# Patient Record
Sex: FEMALE | Race: WHITE | HISPANIC OR LATINO
[De-identification: names, ages, dates, MRNs, and addresses within clinical notes are randomized per-mention and may not be internally consistent; named-entity substitution may affect disease eponyms.]

---

## 2022-05-25 ENCOUNTER — APPOINTMENT (OUTPATIENT)
Dept: ORTHOPEDIC SURGERY | Facility: CLINIC | Age: 57
End: 2022-05-25
Payer: COMMERCIAL

## 2022-05-25 VITALS — WEIGHT: 142 LBS | BODY MASS INDEX: 27.88 KG/M2 | HEIGHT: 60 IN

## 2022-05-25 DIAGNOSIS — F17.200 NICOTINE DEPENDENCE, UNSPECIFIED, UNCOMPLICATED: ICD-10-CM

## 2022-05-25 DIAGNOSIS — Z78.9 OTHER SPECIFIED HEALTH STATUS: ICD-10-CM

## 2022-05-25 PROBLEM — Z00.00 ENCOUNTER FOR PREVENTIVE HEALTH EXAMINATION: Status: ACTIVE | Noted: 2022-05-25

## 2022-05-25 PROCEDURE — 73564 X-RAY EXAM KNEE 4 OR MORE: CPT | Mod: LT

## 2022-05-25 PROCEDURE — 99204 OFFICE O/P NEW MOD 45 MIN: CPT

## 2022-05-25 RX ORDER — NAPROXEN 500 MG/1
500 TABLET ORAL TWICE DAILY
Qty: 30 | Refills: 2 | Status: ACTIVE | COMMUNITY
Start: 2022-05-25 | End: 1900-01-01

## 2022-05-25 NOTE — PHYSICAL EXAM
[de-identified] : Neurologic: normal coordination, normal DTR UE/LE , normal sensation, normal mood and affect, orientated and able to communicate. \par Skin: normal skin, no rash, no ulcers and no lesions. \par Lymphatic: no obvious lymphadenopathy in areas examined. \par Constitutional: well developed and well nourished. \par Cardiovascular: peripheral vascular exam is grossly normal. \par Pulmonary: no respiratory distress, lungs clear to auscultation bilaterally. \par Abdomen: normal bowel sounds, non-tender, no HSM and no mass. \par \par Left Knee: Large effusion\par Medial joint line tenderness\par Medial facet of patella tenderness\par Positive Crissy's test \par Posterior tenderness\par \par Xray of the LEFT KNEE - 4 views - is as follows: Patella jami

## 2022-05-25 NOTE — HISTORY OF PRESENT ILLNESS
[de-identified] : The patient is a 56 year old right hand dominant female who presents today complaining of left knee pain   \par Date of Injury/Onset: 5/18/22\par Pain:    At Rest: 0/10 \par With Activity:  4/10 \par Mechanism of injury: pt states she was going up the stairs and felt her knee shift\par This is not a Work Related Injury being treated under Worker's Compensation.\par This is not an athletic injury occurring associated with an interscholastic or organized sports team.\par Quality of symptoms: Dull/ Ache\par Improves with: Advil\par Worse with: Pressure/ Stairs \par Prior treatment: None\par Prior Imaging: None\par Out of work/sport: Working_\par School/Sport/Position/Occupation: Loan Admin \par Additional Information: None\par

## 2022-05-25 NOTE — DISCUSSION/SUMMARY
[Surgical risks reviewed] : Surgical risks reviewed [de-identified] : Follow up after MRI of Left Knee to eval medial meniscus tear\par (questionable root tear)\par Playmaker brace provided\par -----------------------------------------------\par Home Exercise\par The patient is instructed on a home exercise program.\par \par ENDER TUTTLE Acting as a Scribe for Dr. Mac\par I, Ender Tuttle, attest that this documentation has been prepared under the direction and in the presence of Provider Yahir Mac MD.\par \par Activity Modification\par The patient was advised to modify their activities.\par \par Dx / Natural History\par The patient was advised of the diagnosis.  The natural history of the pathology was explained in full to the patient in layman's terms.  Several different treatment options were discussed and explained in full to the patient including the risks and benefits of both surgical and non-surgical treatments.  All questions and concerns were answered.\par \par Pain Guide Activities\par The patient was advised to let pain guide the gradual advancement of activities.\par \par RICE\par I explained to the patient that rest, ice, compression, and elevation would benefit them.  They may return to activity after follow-up or when they no longer have any pain.

## 2022-05-30 ENCOUNTER — FORM ENCOUNTER (OUTPATIENT)
Age: 57
End: 2022-05-30

## 2022-05-31 ENCOUNTER — APPOINTMENT (OUTPATIENT)
Dept: MRI IMAGING | Facility: CLINIC | Age: 57
End: 2022-05-31
Payer: COMMERCIAL

## 2022-05-31 PROCEDURE — 73721 MRI JNT OF LWR EXTRE W/O DYE: CPT | Mod: LT

## 2022-06-01 ENCOUNTER — APPOINTMENT (OUTPATIENT)
Dept: ORTHOPEDIC SURGERY | Facility: CLINIC | Age: 57
End: 2022-06-01
Payer: COMMERCIAL

## 2022-06-01 PROCEDURE — 99214 OFFICE O/P EST MOD 30 MIN: CPT

## 2022-06-01 NOTE — PHYSICAL EXAM
[de-identified] : Neurologic: normal coordination, normal DTR UE/LE , normal sensation, normal mood and affect, orientated and able to communicate. \par Skin: normal skin, no rash, no ulcers and no lesions. \par Lymphatic: no obvious lymphadenopathy in areas examined. \par Constitutional: well developed and well nourished. \par Cardiovascular: peripheral vascular exam is grossly normal. \par Pulmonary: no respiratory distress, lungs clear to auscultation bilaterally. \par Abdomen: normal bowel sounds, non-tender, no HSM and no mass. \par \par Left Knee: Large effusion\par Medial joint line tenderness\par Medial facet of patella tenderness\par Positive Crissy's test \par Posterior tenderness\par \par Xray of the LEFT KNEE - 4 views - is as follows: Patella jami

## 2022-06-01 NOTE — DISCUSSION/SUMMARY
[Surgical risks reviewed] : Surgical risks reviewed [de-identified] : Conservative treatment, nontreatment, nonsurgical intervention and surgical intervention treatment options have been reviewed with the patient.  The patient continues to be symptomatic [and has failed conservative treatment], and elects to move forward with surgical intervention.  The patient is indicated for LEFT KNEE PARTIAL MEDIAL MENISCECTOMY POSSIBLE MEDIAL MENISCUS ROOT REPAIR and all indicated procedures. As such the alternatives, benefits and risks, of the above procedure, including but not limited to bleeding, infection, neurovascular injury, loss of limb, loss of life,  DVT, PE, RSD, inability to return to previous level of activity, inability to return to previous level of employment, advancement of or to osteoarthritic changes, joint instability or motion loss, hardware failure or migration, failure to resolve all symptoms, failure to return to sports and need for further procedures, as well as specific risk of re-tear and OA were discussed with the patient and/or their legal guardian who agreed to move forward with surgical intervention.  They have reviewed and signed the consent form today after expressing understanding of the above documented conversation. The patient or their representative will contact my office as instructed on the preoperative instruction sheet they received today to schedule surgery in a timely manner as discussed.\par \par As a post-operative protocol, I am prescribing an iceless cold/heat compression therapy device for at home use to be used 3-5 times per day at 40 degrees for 35 days as an alternative to pain medication. I would like my patient to begin with simultaneous cold & compression therapy at 10mm pressure. At the patients follow up I will determine whether they should continue with cold, or if they should transition to contrast cold/heat compression therapy. Unlike a conventional cold therapy unit that requires ice, the ThermX iceless device is set to a prescribed temperature that it will remain throughout the entire duration of use, whether that be cold compression, heat compression, or contrast compression. Cold therapy units that depend on ice melt over a very short period and do not provide compression which limits the compliance and effectiveness for pain/inflammation reduction that I am targeting for my patient. I have reached out to [facility] to supply this device as they are the exclusive provider of the ThermX and the patient will be contacted and instructed on how to utilize the device. \par -----------------------------------------------\par Home Exercise\par The patient is instructed on a home exercise program.\par \par ENDER TUTTLE Acting as a Scribe for Dr. Mac\par I, Ender Tuttle, attest that this documentation has been prepared under the direction and in the presence of Provider Yahir Mac MD.\par \par Activity Modification\par The patient was advised to modify their activities.\par \par Dx / Natural History\par The patient was advised of the diagnosis.  The natural history of the pathology was explained in full to the patient in layman's terms.  Several different treatment options were discussed and explained in full to the patient including the risks and benefits of both surgical and non-surgical treatments.  All questions and concerns were answered.\par \par Pain Guide Activities\par The patient was advised to let pain guide the gradual advancement of activities.\par \par RICE\par I explained to the patient that rest, ice, compression, and elevation would benefit them.  They may return to activity after follow-up or when they no longer have any pain.

## 2022-06-01 NOTE — HISTORY OF PRESENT ILLNESS
[de-identified] : The patient is a 56 year old right hand dominant female who presents today complaining of left knee pain \par Date of Injury/Onset: 5/18/22\par Pain: At Rest: 0/10 \par With Activity: 4/10 \par Mechanism of injury: pt states she was going up the stairs and felt her knee shift\par This is not a Work Related Injury being treated under Worker's Compensation.\par This is not an athletic injury occurring associated with an interscholastic or organized sports team.\par Quality of symptoms: Dull/ Ache\par Improves with: Advil\par Worse with: Pressure/ Stairs \par Prior treatment: None\par Prior Imaging: None\par Out of work/sport: Working_\par School/Sport/Position/Occupation: Loan Admin \par Additional Information: None

## 2022-06-17 ENCOUNTER — RESULT REVIEW (OUTPATIENT)
Age: 57
End: 2022-06-17

## 2022-06-17 ENCOUNTER — APPOINTMENT (OUTPATIENT)
Dept: ORTHOPEDIC SURGERY | Facility: HOSPITAL | Age: 57
End: 2022-06-17
Payer: COMMERCIAL

## 2022-06-17 PROCEDURE — 29875 ARTHRS KNEE SURG SYNVCT LMTD: CPT | Mod: 59,LT

## 2022-06-17 PROCEDURE — 29881 ARTHRS KNE SRG MNISECTMY M/L: CPT | Mod: AS,LT

## 2022-06-17 PROCEDURE — 29874 ARTHRS KNEE SURG RMV LOOS/FB: CPT | Mod: AS,59,LT

## 2022-06-17 PROCEDURE — 29875 ARTHRS KNEE SURG SYNVCT LMTD: CPT | Mod: AS,59,LT

## 2022-06-17 PROCEDURE — 29874 ARTHRS KNEE SURG RMV LOOS/FB: CPT | Mod: 59,LT

## 2022-06-17 PROCEDURE — 29881 ARTHRS KNE SRG MNISECTMY M/L: CPT | Mod: LT

## 2022-06-17 RX ORDER — ONDANSETRON 4 MG/1
4 TABLET, ORALLY DISINTEGRATING ORAL
Qty: 15 | Refills: 0 | Status: ACTIVE | COMMUNITY
Start: 2022-06-17 | End: 1900-01-01

## 2022-06-17 RX ORDER — HYDROCODONE BITARTRATE AND ACETAMINOPHEN 5; 325 MG/1; MG/1
5-325 TABLET ORAL
Qty: 30 | Refills: 0 | Status: ACTIVE | COMMUNITY
Start: 2022-06-17 | End: 1900-01-01

## 2022-06-26 ENCOUNTER — FORM ENCOUNTER (OUTPATIENT)
Age: 57
End: 2022-06-26

## 2022-06-28 ENCOUNTER — APPOINTMENT (OUTPATIENT)
Dept: ORTHOPEDIC SURGERY | Facility: CLINIC | Age: 57
End: 2022-06-28
Payer: COMMERCIAL

## 2022-06-28 VITALS — HEIGHT: 60 IN | WEIGHT: 142 LBS | BODY MASS INDEX: 27.88 KG/M2

## 2022-06-28 PROCEDURE — 99024 POSTOP FOLLOW-UP VISIT: CPT

## 2022-06-28 NOTE — DISCUSSION/SUMMARY
[de-identified] : Inspected wound\par Removed sutures\par Applied steri-strips\par Reviewed all surgical images with patient and provided copies to take home\par Continue physical therapy\par Provided printout for Reparel sleeve\par Follow up in 6 weeks\par \par -----------------------------------------------\par Home Exercise\par The patient is instructed on a home exercise program.\par \par ENDER TUTTLE Acting as a Scribe for Dr. Mac\par I, Ender Tuttle, attest that this documentation has been prepared under the direction and in the presence of Provider Yahir Mac MD.\par \par Activity Modification\par The patient was advised to modify their activities.\par \par Dx / Natural History\par The patient was advised of the diagnosis.  The natural history of the pathology was explained in full to the patient in layman's terms.  Several different treatment options were discussed and explained in full to the patient including the risks and benefits of both surgical and non-surgical treatments.  All questions and concerns were answered.\par \par Pain Guide Activities\par The patient was advised to let pain guide the gradual advancement of activities.\par \par RICE\par I explained to the patient that rest, ice, compression, and elevation would benefit them.  They may return to activity after follow-up or when they no longer have any pain.

## 2022-06-28 NOTE — ASSESSMENT
[FreeTextEntry1] : MRI [facility] 5/31/22 Left Knee\par My own reading - no radiologist report yet. \par \par Complex medial meniscus tear posterior horn and body with displaced fragment. Likely complete root tear as well.\par

## 2022-06-28 NOTE — HISTORY OF PRESENT ILLNESS
[de-identified] : The patient is s/p Left knee diagnostic arthroscopy, partial medial meniscectomy removal of loose body, limited synovectomy \par Date of Surgery: 6/17/22\par Pain:    At Rest: 2/10 \par With Activity:  5/10 \par Mechanism of injury: Pt states she was going up the stairs and felt her knee shift\par This is not a Work Related Injury being treated under Worker's Compensation.\par This is not an athletic injury occurring associated with an interscholastic or organized sports team.\par Treatment/Imaging/Studies Since Last Visit: sx \par 	Reports Available For Review Today: sx doc\par Out of work/sport: _, since _\par School/Sport/Position/Occupation: loan admin \par Change since last visit: Surgery,  \par Additional Information: None\par  \par

## 2022-06-28 NOTE — PHYSICAL EXAM
[de-identified] : Neurologic: normal coordination, normal DTR UE/LE , normal sensation, normal mood and affect, orientated and able to communicate. \par Skin: normal skin, no rash, no ulcers and no lesions. \par Lymphatic: no obvious lymphadenopathy in areas examined. \par Constitutional: well developed and well nourished. \par Cardiovascular: peripheral vascular exam is grossly normal. \par Pulmonary: no respiratory distress, lungs clear to auscultation bilaterally. \par Abdomen: normal bowel sounds, non-tender, no HSM and no mass. \par \par Left Knee: No effusion, clean and dry incisions, intact skin, no fluctuance, no sign of infection, no wound erythema, no induration, no drainage, sutures removed, steri-strips applied. \par \par Xray of the LEFT KNEE - 4 views - is as follows: Patella jami

## 2022-08-09 ENCOUNTER — APPOINTMENT (OUTPATIENT)
Dept: ORTHOPEDIC SURGERY | Facility: CLINIC | Age: 57
End: 2022-08-09

## 2022-08-09 VITALS — BODY MASS INDEX: 27.88 KG/M2 | WEIGHT: 142 LBS | HEIGHT: 60 IN

## 2022-08-09 PROCEDURE — 99024 POSTOP FOLLOW-UP VISIT: CPT

## 2022-08-09 NOTE — HISTORY OF PRESENT ILLNESS
[de-identified] : The patient is s/p Left knee diagnostic arthroscopy, partial medial meniscectomy removal of loose body, limited synovectomy \par Date of Surgery: 6/17/22\par Pain: At Rest: 0/10 \par With Activity: 0/10 \par Mechanism of injury: Pt states she was going up the stairs and felt her knee shift\par This is not a Work Related Injury being treated under Worker's Compensation.\par This is not an athletic injury occurring associated with an interscholastic or organized sports team.\par Treatment/Imaging/Studies Since Last Visit: PT\par 	Reports Available For Review Today: N/A\par Out of work/sport: _, since _\par School/Sport/Position/Occupation: loan admin \par Change since last visit: Minimal persistent pressure and stiffness \par Additional Information: None

## 2022-08-09 NOTE — PHYSICAL EXAM
[de-identified] : Neurologic: normal coordination, normal DTR UE/LE , normal sensation, normal mood and affect, orientated and able to communicate. \par Skin: normal skin, no rash, no ulcers and no lesions. \par Lymphatic: no obvious lymphadenopathy in areas examined. \par Constitutional: well developed and well nourished. \par Cardiovascular: peripheral vascular exam is grossly normal. \par Pulmonary: no respiratory distress, lungs clear to auscultation bilaterally. \par Abdomen: normal bowel sounds, non-tender, no HSM and no mass. \par \par Left Knee: Moderate effusion, clean and dry incisions, intact skin, no fluctuance, no sign of infection, no wound erythema, no induration, no drainage,\par \par Xray of the LEFT KNEE - 4 views - is as follows: Patella jami

## 2022-08-09 NOTE — DISCUSSION/SUMMARY
[de-identified] : Aspirated 5ml of clear synovial fluid from L knee using ultrasound for proper placement\par No corticosteroid injection\par Follow up 6 weeks \par \par -----------------------------------------------\par Home Exercise\par The patient is instructed on a home exercise program.\par \par ENDER TUTTLE Acting as a Scribe for Dr. Mac\par I, Ender Tuttle, attest that this documentation has been prepared under the direction and in the presence of Provider Yahir Mac MD.\par \par Activity Modification\par The patient was advised to modify their activities.\par \par Dx / Natural History\par The patient was advised of the diagnosis.  The natural history of the pathology was explained in full to the patient in layman's terms.  Several different treatment options were discussed and explained in full to the patient including the risks and benefits of both surgical and non-surgical treatments.  All questions and concerns were answered.\par \par Pain Guide Activities\par The patient was advised to let pain guide the gradual advancement of activities.\par \par RICE\par I explained to the patient that rest, ice, compression, and elevation would benefit them.  They may return to activity after follow-up or when they no longer have any pain.

## 2022-09-20 ENCOUNTER — APPOINTMENT (OUTPATIENT)
Dept: ORTHOPEDIC SURGERY | Facility: CLINIC | Age: 57
End: 2022-09-20
Payer: COMMERCIAL

## 2022-09-20 DIAGNOSIS — S89.92XA UNSPECIFIED INJURY OF LEFT LOWER LEG, INITIAL ENCOUNTER: ICD-10-CM

## 2022-09-20 DIAGNOSIS — M25.562 PAIN IN LEFT KNEE: ICD-10-CM

## 2022-09-20 DIAGNOSIS — M23.92 UNSPECIFIED INTERNAL DERANGEMENT OF LEFT KNEE: ICD-10-CM

## 2022-09-20 DIAGNOSIS — S83.232A COMPLEX TEAR OF MEDIAL MENISCUS, CURRENT INJURY, LEFT KNEE, INITIAL ENCOUNTER: ICD-10-CM

## 2022-09-20 PROCEDURE — 99214 OFFICE O/P EST MOD 30 MIN: CPT

## 2022-09-20 NOTE — DISCUSSION/SUMMARY
[de-identified] : Pain resolved \par Avoid deep bending \par Follow up as needed \par -----------------------------------------------\par Home Exercise\par The patient is instructed on a home exercise program.\par \par ENDER TUTTLE Acting as a Scribe for Dr. Mac\par I, Ender Tuttle, attest that this documentation has been prepared under the direction and in the presence of Provider Yahir Mac MD.\par \par Activity Modification\par The patient was advised to modify their activities.\par \par Dx / Natural History\par The patient was advised of the diagnosis.  The natural history of the pathology was explained in full to the patient in layman's terms.  Several different treatment options were discussed and explained in full to the patient including the risks and benefits of both surgical and non-surgical treatments.  All questions and concerns were answered.\par \par Pain Guide Activities\par The patient was advised to let pain guide the gradual advancement of activities.\par \par RICE\par I explained to the patient that rest, ice, compression, and elevation would benefit them.  They may return to activity after follow-up or when they no longer have any pain.

## 2022-09-20 NOTE — HISTORY OF PRESENT ILLNESS
[de-identified] : The patient is s/p Left knee diagnostic arthroscopy, partial medial meniscectomy removal of loose body, limited synovectomy \par Date of Surgery: 6/17/22\par Pain: At Rest: 0/10 \par With Activity: 0/10 \par Mechanism of injury: Pt states she was going up the stairs and felt her knee shift\par This is not a Work Related Injury being treated under Worker's Compensation.\par This is not an athletic injury occurring associated with an interscholastic or organized sports team.\par Treatment/Imaging/Studies Since Last Visit: PT\par 	Reports Available For Review Today: N/A\par Out of work/sport: _, since _\par School/Sport/Position/Occupation: loan admin \par Change since last visit: Minimal persistent pressure and stiffness \par Additional Information: None

## 2022-09-20 NOTE — PHYSICAL EXAM
[de-identified] : Neurologic: normal coordination, normal DTR UE/LE , normal sensation, normal mood and affect, orientated and able to communicate. \par Skin: normal skin, no rash, no ulcers and no lesions. \par Lymphatic: no obvious lymphadenopathy in areas examined. \par Constitutional: well developed and well nourished. \par Cardiovascular: peripheral vascular exam is grossly normal. \par Pulmonary: no respiratory distress, lungs clear to auscultation bilaterally. \par Abdomen: normal bowel sounds, non-tender, no HSM and no mass. \par \par Left Knee: Moderate effusion, clean and dry incisions, intact skin, no fluctuance, no sign of infection, no wound erythema, no induration, no drainage,\par \par Xray of the LEFT KNEE - 4 views - is as follows: Patella jami

## 2023-02-13 ENCOUNTER — APPOINTMENT (OUTPATIENT)
Dept: OBGYN | Facility: CLINIC | Age: 58
End: 2023-02-13
Payer: COMMERCIAL

## 2023-02-13 VITALS
BODY MASS INDEX: 27.88 KG/M2 | WEIGHT: 142 LBS | SYSTOLIC BLOOD PRESSURE: 123 MMHG | HEIGHT: 60 IN | DIASTOLIC BLOOD PRESSURE: 79 MMHG

## 2023-02-13 PROCEDURE — 99386 PREV VISIT NEW AGE 40-64: CPT

## 2023-02-13 PROCEDURE — 99203 OFFICE O/P NEW LOW 30 MIN: CPT | Mod: 25

## 2023-02-14 NOTE — PHYSICAL EXAM
[Appropriately responsive] : appropriately responsive [Alert] : alert [No Acute Distress] : no acute distress [Soft] : soft [Non-tender] : non-tender [Non-distended] : non-distended [No HSM] : No HSM [No Lesions] : no lesions [No Mass] : no mass [Oriented x3] : oriented x3 [Examination Of The Breasts] : a normal appearance [No Masses] : no breast masses were palpable [Vulvar Atrophy] : vulvar atrophy [Labia Majora] : normal [Labia Minora] : normal [Atrophy] : atrophy [Normal] : normal [FreeTextEntry5] : Cervical Pap smear performed

## 2023-02-14 NOTE — PLAN
[FreeTextEntry1] : \par Clinical breast exam performed and patient was counseled on self breast exam.  Rx for mammogram and ultrasound written.\par DEXA scan\par Pap smear performed and patient's history of a prior abnormal Pap smear requiring colposcopy was discussed at length\par A general laboratory panel was ordered at patient request and patient was counseled that she may be referred to a primary care physician or specialist depending on the results.\par Pathogenesis of vulvar/vaginal atrophy discussed at length and patient will be restarted on her vaginal cream containing estrogen.  Rx sent for to the pharmacy with direction.\par Patient was advised to establish care with a primary care physician\par Patient is due for colonoscopy and was directed on the importance of this especially due to her history of anal fistula repair\par All questions addressed\par She may return to office in 1 years time, or as needed.

## 2023-02-14 NOTE — HISTORY OF PRESENT ILLNESS
[FreeTextEntry1] : 57-year-old female G1, P1 presenting office for her annual well woman appointment, to establish care with a new GYN provider, and for an additional concern regarding vaginal dryness.  Patient reports being on a vaginal estrogen supplementation prior, which she has run out of the medication and since discontinued.  She now has discomfort and pain and would like to restart this medication.\par \par Obstetric history 1 prior  section approximately 37 years prior.  Reports this gynecologic history significant for menopause in her early 40s, her mother also went through her menopause in her early 40s.  She does have a history of an abnormal Pap smear which were called colposcopic examination which resulted negative.  Denies medical history.  Surgical history of left meniscus repair.  She also has additional surgical history of an anal fistula repair.  Family is significant for her mother with breast cancer in her 70s which required a lumpectomy and radiation.  Denies alcohol, tobacco, illicit drug use.  Denies allergies to medications.

## 2023-02-15 LAB
CYTOLOGY CVX/VAG DOC THIN PREP: NORMAL
HPV HIGH+LOW RISK DNA PNL CVX: NOT DETECTED

## 2024-06-17 ENCOUNTER — APPOINTMENT (OUTPATIENT)
Dept: OBGYN | Facility: CLINIC | Age: 59
End: 2024-06-17
Payer: COMMERCIAL

## 2024-06-17 VITALS
SYSTOLIC BLOOD PRESSURE: 113 MMHG | HEIGHT: 60 IN | DIASTOLIC BLOOD PRESSURE: 76 MMHG | BODY MASS INDEX: 27.68 KG/M2 | WEIGHT: 141 LBS

## 2024-06-17 DIAGNOSIS — Z01.419 ENCOUNTER FOR GYNECOLOGICAL EXAMINATION (GENERAL) (ROUTINE) W/OUT ABNORMAL FINDINGS: ICD-10-CM

## 2024-06-17 DIAGNOSIS — N95.2 POSTMENOPAUSAL ATROPHIC VAGINITIS: ICD-10-CM

## 2024-06-17 PROCEDURE — 99396 PREV VISIT EST AGE 40-64: CPT

## 2024-06-18 LAB — HPV HIGH+LOW RISK DNA PNL CVX: NOT DETECTED

## 2024-06-20 PROBLEM — N95.2 VAGINAL ATROPHY: Status: ACTIVE | Noted: 2023-02-13

## 2024-06-20 RX ORDER — CONJUGATED ESTROGENS 0.62 MG/G
0.62 CREAM VAGINAL
Qty: 1 | Refills: 4 | Status: ACTIVE | COMMUNITY
Start: 2023-02-13 | End: 1900-01-01

## 2024-06-20 NOTE — REASON FOR VISIT
[Annual] : an annual visit. [FreeTextEntry2] : Annual well woman appointment and refill of estrogen supplementation

## 2024-06-20 NOTE — HISTORY OF PRESENT ILLNESS
[FreeTextEntry1] : 58-year-old female G1, P1 presenting office for her annual appointment.  She would also like a refill of her estrogen cream which was started secondary to vaginal atrophy.  She has a primary care physician.  A mammogram and ultrasound was already ordered through the primary care physician office.  She has not had a colonoscopy.  Obstetric history 1 prior  section approximately 38 years prior.  Reports this gynecologic history significant for menopause in her early 40s, her mother also went through her menopause in her early 40s.  She does have a history of an abnormal Pap smear which were called colposcopic examination which resulted negative.  Cervical Pap smear 2024 NILM negative high-risk.  Denies medical history.  Surgical history of left meniscus repair.  She also has additional surgical history of an anal fistula repair.  Family is significant for her mother with breast cancer in her 70s which required a lumpectomy and radiation.  Denies alcohol, tobacco, illicit drug use.  Denies allergies to medications.

## 2024-06-23 LAB — CYTOLOGY CVX/VAG DOC THIN PREP: NORMAL
